# Patient Record
Sex: MALE | Race: WHITE | Employment: OTHER | URBAN - METROPOLITAN AREA
[De-identification: names, ages, dates, MRNs, and addresses within clinical notes are randomized per-mention and may not be internally consistent; named-entity substitution may affect disease eponyms.]

---

## 2017-02-13 ENCOUNTER — HOSPITAL ENCOUNTER (OUTPATIENT)
Dept: PHYSICAL THERAPY | Age: 82
Discharge: HOME | End: 2017-02-13
Payer: MEDICARE

## 2017-02-13 DIAGNOSIS — N32.81 OAB (OVERACTIVE BLADDER): ICD-10-CM

## 2017-02-13 PROCEDURE — 97110 THERAPEUTIC EXERCISES: CPT

## 2017-02-13 PROCEDURE — G8988 SELF CARE GOAL STATUS: HCPCS

## 2017-02-13 PROCEDURE — G8987 SELF CARE CURRENT STATUS: HCPCS

## 2017-02-13 PROCEDURE — 97162 PT EVAL MOD COMPLEX 30 MIN: CPT

## 2017-02-13 NOTE — PROGRESS NOTES
Bulverde Class  : 1935 [facility] at [address] [place] [address] 52, [address] [address],[address], [address]  [phone]   [phone]          OUTPATIENT PHYSICAL THERAPY:Initial Assessment 2017    ICD-10: Treatment Diagnosis: Urge incontinence (N39.41)  Precautions/Allergies:   Atorvastatin   Fall Risk Score: 1 (? 5 = High Risk)  MD Orders: Eval and treat MEDICAL/REFERRING DIAGNOSIS:  OAB (overactive bladder) [N32.81]   DATE OF ONSET: 10/26/2016  REFERRING PHYSICIAN: Jeremie Gruber MD  RETURN PHYSICIAN APPOINTMENT: TBD     INITIAL ASSESSMENT:  Mr. Fito Piña presents poor bladder health and poor strength and coordination of pelvic floor mm contributing to urge incontinence, noctoria,  and urinary frequency. He is unable localize PF musculature without cueing. She would benefit form skilled PT to address the above deficits. He would benefit from participating in a PF strengthening program with biofeedback for correct mm activation as well as NMES for improved mm activation. PROBLEM LIST (Impacting functional limitations): 1. poor bladder health  2. Decreased strength of pelvic floor which limits bladder control INTERVENTIONS PLANNED:  1. Neuromuscular re-education  2. Biofeedback as needed  3. HEP  4. Bladder retraining  5. Bladder education  6. Electrical Stimulation  7. Home Exercise Program (HEP)  8. Neuromuscular Re-education/Strengthening  9. Range of Motion (ROM)  10. Therapeutic Activites     TREATMENT PLAN:  Effective Dates: 2017 TO 2017. Frequency/Duration: 1 time a week for 12 weeks  GOALS: (Goals have been discussed and agreed upon with patient.)  Short-Term Functional Goals: Time Frame: in 3 weeks  1.  Patient will demonstrate independence with home exercise program.  2. Patient will demonstrate an independent contraction of the PFM without overflow muscle activity within 3 weeks with verbal cues only in order to progress pelvic floor strength pelvic floor strength and continence control. Discharge Goals: Time Frame: in 12 weeks  1. Pt will demonstrate an increase in PFM endurance from 8 hold to 15 hold x 10 reps as measured by EMG within 6 weeks in order to improve PFM activity and thus decrease incontinence. 2. Patient will incorporate a voiding schedule and urge suppression techniques into hisdaily routine to manage urgency, frequency, and incontinence with a goal of 10 minutes of delay. 3. Pt report voiding no less than every hours. 4. Pt will report nocturia less than 2 x per night. Rehabilitation Potential For Stated Goals: Good  Regarding Elliott Moreno therapy, I certify that the treatment plan above will be carried out by a therapist or under their direction. Thank you for this referral,  Lupe Chan PT     Referring Physician Signature: Siva Bobo MD              Date                    The information in this section was collected on 2/13/2017 (except where otherwise noted). HISTORY:   History of Present Injury/Illness (Reason for Referral): The patient is status post dilation of urethral stricture and bipolar photo vaporization of the prostate on October 26. Past Medical History/Comorbidities:   Mr. Yonatan Baez  has a past medical history of Atrial fibrillation ([address] CAD (coronary artery disease) (1987); Chronic obstructive pulmonary disease ([address] Elevated cholesterol; Hypertension; Left carotid stenosis; Myasthenia gravis ([address] Osteoarthritis (arthritis due to wear and tear of joints); Prostate cancer ([address] (2011); PVD (peripheral vascular disease) ([address] S/P total knee arthroplasty (11/14/2014); and Urinary urgency. Mr. Yonatan Baez  has a past surgical history that includes knee replacement (Right); vascular surgery procedure unlist (Right); cabg, artery-vein, two; and cataract removal (Bilateral).   Social History/Living Environment:    Lives at home with wife  Prior Level of Function/Work/Activity:  Golf, retired, yardwork  Personal Factors:          Past/Current Experience:  Did not need a pad previously. Current Medications:    Current Outpatient Prescriptions:     warfarin (COUMADIN) 7.5 mg tablet, Take 7.5 mg by mouth daily. Pt takes this 2 days a week, Disp: , Rfl:     warfarin (COUMADIN) 5 mg tablet, Take 5 mg by mouth daily. , Disp: , Rfl:     amoxicillin-clavulanate (AUGMENTIN) 500-125 mg per tablet, Take 1 Tab by mouth two (2) times a day., Disp: 14 Tab, Rfl: 0    MYRBETRIQ 50 mg ER tablet, Take 1 Tab by mouth daily. , Disp: 42 Tab, Rfl: 0    HYDROcodone-acetaminophen (NORCO) 7.5-325 mg per tablet, Take 1 Tab by mouth every four (4) hours as needed for Pain. Max Daily Amount: 6 Tabs., Disp: 20 Tab, Rfl: 0    cyanocobalamin 1,000 mcg tablet, Take 1,000 mcg by mouth nightly., Disp: , Rfl:     pyridostigmine (MESTINON) 60 mg tablet, Take 60 mg by mouth every morning., Disp: , Rfl:     metoprolol succinate (TOPROL-XL) 100 mg XL tablet, Take 100 mg by mouth nightly. Indications: Hypertension, Disp: , Rfl:     lisinopril (PRINIVIL, ZESTRIL) 20 mg tablet, Take 20 mg by mouth nightly. Indications: Hypertension, Disp: , Rfl:     simvastatin (ZOCOR) 40 mg tablet, Take 40 mg by mouth nightly.  Indications: HYPERCHOLESTEROLEMIA, Disp: , Rfl:    Date Last Reviewed:  2/13/2017   Incontinence History:  PROBLEM: YES/NO: COMMENTS:   Loss of urine with coughing NO    Loss of urine with lifting  NO    Loss of urine with exercise, running NO    Loss of urine with strong urge YES    Loss of urine with approaching the bathroom YES    Loss of urine with key in lock NO    Loss of urine as getting to toilet/removing clothing NO    Loss of urine when hearing running water YES    Have difficulty initiating a urine stream NO    Have difficulty stopping urine stream NO Unknown   Have to strain to empty bladder NO    Dribble urine when urinating NO    Dribble urine after emptying bladder NO    Experience pain with urination NO Experience burning during urination YES Getting better   Have blood in urine NO      Voiding Patterns: Patient voids every 30 min to 1 hours during the day and 4 times during the night (every 2 hours). Going about every 30 mins. Patient reports that he empties bladder. Patient uses pads for bladder protection; he changes pads 2 times per day. Fluid Intake: Patient drinks 2 cups of water per day. He consumes 1 cups of caffeinated beverages per day. Drinks 2 glasses a day of Coke. Patient not limit fluid intake to control bladder. Bowel Habits: Patient demonstrates normal bowel habits. Has 2-3 bowel movements a day. Mobility / Self Care: I  Personal / Social History:  · Sexually active? NO:   · Social activities restricted due to urinary incontinence? YES: Golf and daily activities. Number of Personal Factors/Comorbidities that affect the Plan of Care: 1-2: MODERATE COMPLEXITY   EXAMINATION:   External Observation:   · Voluntary Contraction: present  8 sec hold with poor coordination   · Voluntary Relaxation: present  · Involuntary Contraction: NT  · Involuntary Relaxation: Nt       Body Structures Involved:  1. Muscles Body Functions Affected:  1. Genitourinary Activities and Participation Affected:  1. Self Care  2. Community, Social and [place]   Number of elements that affect the Plan of Care: 3: MODERATE COMPLEXITY   CLINICAL PRESENTATION:   Presentation: Evolving clinical presentation with changing clinical characteristics: MODERATE COMPLEXITY   CLINICAL DECISION MAKING:   Outcome Measure:    Tool Used: NIH - Chronic Prostatitis Symptom Index (NIH - CPSI for Males)   Score:  Initial: 3/5/10 Most Recent: X (Date: -- )   Interpretation of Score:  Pain:  Score 0 1-4 5-8 9-12 13-16 17-20 21   Modifier CH CI CJ CK CL CM CN     Urinary Symptoms:  Score 0 1 2-3 4-5 6-7 8-9 10   Modifier CH CI CJ CK CL CM CN     Quality of Life Index:  Score 0 1-2 3-4 5-7 8-9 10-11 12   Modifier CH CI CJ CK CL CM CN ? Self Care:     - CURRENT STATUS: CM - 80%-99% impaired, limited or restricted    - GOAL STATUS: CI - 1%-19% impaired, limited or restricted    - D/C STATUS:  ---------------To be determined---------------       Medical Necessity:   · Patient is expected to demonstrate progress in strength and coordination to increase independence with Kegel. .  · Patient demonstrates good rehab potential due to higher previous functional level. Reason for Services/Other Comments:  · Patient continues to require skilled intervention due to poor strength and endurance as well as poor bladder health contributing to incontinence. .   Use of outcome tool(s) and clinical judgement create a POC that gives a: Questionable prediction of patient's progress: MODERATE COMPLEXITY   TREATMENT:   (In addition to Assessment/Re-Assessment sessions the following treatments were rendered)  Pre-treatment Symptoms/Complaints:  Urinary urgency and frequency. Pain: Initial:   Pain Intensity 1: 0 0/10 Post Session:  0/10     THERAPEUTIC EXERCISE: (20 minutes):  Exercises per grid below to improve strength and coordination. Required moderate verbal and tactile cues to promote proper body breathing techniques. Progressed resistance, repetitions and complexity of movement as indicated. Exercises:  Patient instructed in pelvic floor exercises listed below:   Date:  2/8/2017 Date:   Date:     Activity/Exercise Parameters Parameters Parameters   Pt education 10 min     Kegel in supine with manual assist for tactile feedback  10 sec on 10 sec off x 10   2 on: 2 off x 10 x 2                                       The following educational topics were reviewed with patient:  Bladder health, tips to control urge, bladder diary, pelvic floor anatomy, how foods affect bladder, bladder retraining. · Treatment/Session Assessment: . To promote tissue length and joint mobility for return to previous level of function.   · Response to Treatment: Good  · Compliance with Program/Exercises: Will assess as treatment progresses. · Recommendations/Intent for next treatment session: \"Next visit will focus on advancements to more challenging activities\".   Total Treatment Duration:  PT Patient Time In/Time Out  Time In: 1000  Time Out: 1100    Jeannie Coley PT

## 2017-02-13 NOTE — PROGRESS NOTES
Ambulatory/Rehab Services H2 Model Falls Risk Assessment    Risk Factor Pts. ·   Confusion/Disorientation/Impulsivity  []    4 ·   Symptomatic Depression  []   2 ·   Altered Elimination  []   1 ·   Dizziness/Vertigo  []   1 ·   Gender (Male)  [x]   1 ·   Any administered antiepileptics (anticonvulsants):  []   2 ·   Any administered benzodiazepines:  []   1 ·   Visual Impairment (specify):  []   1 ·   Portable Oxygen Use  []   1 ·   Orthostatic ? BP  []   1 ·   History of Recent Falls (within 3 mos.)  []   5     Ability to Rise from Chair (choose one) Pts. ·   Ability to rise in a single movement  [x]   0 ·   Pushes up, successful in one attempt  []   1 ·   Multiple attempts, but successful  []   3 ·   Unable to rise without assistance  []   4   Total: (5 or greater = High Risk) 1     Falls Prevention Plan:   []                Physical Limitations to Exercise (specify):   []                Mobility Assistance Device (type):   []                Exercise/Equipment Adaptation (specify):    ©2010 [facility] of [address] Patent #[id number].  Federal Law prohibits the replication, distribution or use without written permission from [facility] "[employer]

## 2017-02-20 ENCOUNTER — HOSPITAL ENCOUNTER (OUTPATIENT)
Dept: PHYSICAL THERAPY | Age: 82
Discharge: HOME | End: 2017-02-20
Payer: MEDICARE

## 2017-02-20 PROCEDURE — 97110 THERAPEUTIC EXERCISES: CPT

## 2017-02-20 NOTE — PROGRESS NOTES
Carrie Prader  : 1935 [facility] at [facility]  [address], [address] [address],[address], [address] [facility]  [phone]   [phone]          OUTPATIENT PHYSICAL THERAPY:Daily Note 2017    ICD-10: Treatment Diagnosis: Urge incontinence (N39.41)  Precautions/Allergies:   Atorvastatin   Fall Risk Score: 1 (? 5 = High Risk)  MD Orders: Eval and treat MEDICAL/REFERRING DIAGNOSIS:  Overactive bladder [N32.81]   DATE OF ONSET: 10/26/2016  REFERRING PHYSICIAN: Andrés Smith MD  RETURN PHYSICIAN APPOINTMENT: TBD     INITIAL ASSESSMENT:  Mr. Peace Lomax presents poor bladder health and poor strength and coordination of pelvic floor mm contributing to urge incontinence, noctoria,  and urinary frequency. He is unable localize PF musculature without cueing. She would benefit form skilled PT to address the above deficits. He would benefit from participating in a PF strengthening program with biofeedback for correct mm activation as well as NMES for improved mm activation. PROBLEM LIST (Impacting functional limitations): 1. poor bladder health  2. Decreased strength of pelvic floor which limits bladder control INTERVENTIONS PLANNED:  1. Neuromuscular re-education  2. Biofeedback as needed  3. HEP  4. Bladder retraining  5. Bladder education  6. Electrical Stimulation  7. Home Exercise Program (HEP)  8. Neuromuscular Re-education/Strengthening  9. Range of Motion (ROM)  10. Therapeutic Activites     TREATMENT PLAN:  Effective Dates: 2017 TO 2017. Frequency/Duration: 1 time a week for 12 weeks  GOALS: (Goals have been discussed and agreed upon with patient.)  Short-Term Functional Goals: Time Frame: in 3 weeks  1.  Patient will demonstrate independence with home exercise program.  2. Patient will demonstrate an independent contraction of the PFM without overflow muscle activity within 3 weeks with verbal cues only in order to progress pelvic floor strength pelvic floor strength and continence control. Discharge Goals: Time Frame: in 12 weeks  1. Pt will demonstrate an increase in PFM endurance from 8 hold to 15 hold x 10 reps as measured by EMG within 6 weeks in order to improve PFM activity and thus decrease incontinence. 2. Patient will incorporate a voiding schedule and urge suppression techniques into hisdaily routine to manage urgency, frequency, and incontinence with a goal of 10 minutes of delay. 3. Pt report voiding no less than every hours. 4. Pt will report nocturia less than 2 x per night. Rehabilitation Potential For Stated Goals: Good  Regarding Felipa Doll therapy, I certify that the treatment plan above will be carried out by a therapist or under their direction. Thank you for this referral,  [facility], PT     Referring Physician Signature: Jessica Weinberg MD              Date                    The information in this section was collected on 2/13/2017 (except where otherwise noted). HISTORY:   History of Present Injury/Illness (Reason for Referral): The patient is status post dilation of urethral stricture and bipolar photo vaporization of the prostate on October 26. Past Medical History/Comorbidities:   Mr. Patric Zhao  has a past medical history of Atrial fibrillation ([address] CAD (coronary artery disease) (1987); Chronic obstructive pulmonary disease ([address] Elevated cholesterol; Hypertension; Left carotid stenosis; Myasthenia gravis ([address] Osteoarthritis (arthritis due to wear and tear of joints); Prostate cancer ([address] (2011); PVD (peripheral vascular disease) ([address] S/P total knee arthroplasty (11/14/2014); and Urinary urgency. Mr. Patric Zhao  has a past surgical history that includes knee replacement (Right); vascular surgery procedure unlist (Right); cabg, artery-vein, two; and cataract removal (Bilateral).   Social History/Living Environment:    Lives at home with wife  Prior Level of Function/Work/Activity:  Golf, retired, yardwork  Personal Factors:          Past/Current Experience:  Did not need a pad previously. Current Medications:    Current Outpatient Prescriptions:     warfarin (COUMADIN) 7.5 mg tablet, Take 7.5 mg by mouth daily. Pt takes this 2 days a week, Disp: , Rfl:     warfarin (COUMADIN) 5 mg tablet, Take 5 mg by mouth daily. , Disp: , Rfl:     amoxicillin-clavulanate (AUGMENTIN) 500-125 mg per tablet, Take 1 Tab by mouth two (2) times a day., Disp: 14 Tab, Rfl: 0    MYRBETRIQ 50 mg ER tablet, Take 1 Tab by mouth daily. , Disp: 42 Tab, Rfl: 0    HYDROcodone-acetaminophen (NORCO) 7.5-325 mg per tablet, Take 1 Tab by mouth every four (4) hours as needed for Pain. Max Daily Amount: 6 Tabs., Disp: 20 Tab, Rfl: 0    cyanocobalamin 1,000 mcg tablet, Take 1,000 mcg by mouth nightly., Disp: , Rfl:     pyridostigmine (MESTINON) 60 mg tablet, Take 60 mg by mouth every morning., Disp: , Rfl:     metoprolol succinate (TOPROL-XL) 100 mg XL tablet, Take 100 mg by mouth nightly. Indications: Hypertension, Disp: , Rfl:     lisinopril (PRINIVIL, ZESTRIL) 20 mg tablet, Take 20 mg by mouth nightly. Indications: Hypertension, Disp: , Rfl:     simvastatin (ZOCOR) 40 mg tablet, Take 40 mg by mouth nightly.  Indications: HYPERCHOLESTEROLEMIA, Disp: , Rfl:    Date Last Reviewed:  2/20/2017   Incontinence History:  PROBLEM: YES/NO: COMMENTS:   Loss of urine with coughing NO    Loss of urine with lifting  NO    Loss of urine with exercise, running NO    Loss of urine with strong urge YES    Loss of urine with approaching the bathroom YES    Loss of urine with key in lock NO    Loss of urine as getting to toilet/removing clothing NO    Loss of urine when hearing running water YES    Have difficulty initiating a urine stream NO    Have difficulty stopping urine stream NO Unknown   Have to strain to empty bladder NO    Dribble urine when urinating NO    Dribble urine after emptying bladder NO    Experience pain with urination NO Experience burning during urination YES Getting better   Have blood in urine NO      Voiding Patterns: Patient voids every 30 min to 1 hours during the day and 4 times during the night (every 2 hours). Going about every 30 mins. Patient reports that he empties bladder. Patient uses pads for bladder protection; he changes pads 2 times per day. Fluid Intake: Patient drinks 2 cups of water per day. He consumes 1 cups of caffeinated beverages per day. Drinks 2 glasses a day of Coke. Patient not limit fluid intake to control bladder. Bowel Habits: Patient demonstrates normal bowel habits. Has 2-3 bowel movements a day. Mobility / Self Care: I  Personal / Social History:  · Sexually active? NO:   · Social activities restricted due to urinary incontinence? YES: Golf and daily activities. Number of Personal Factors/Comorbidities that affect the Plan of Care: 1-2: MODERATE COMPLEXITY   EXAMINATION:   External Observation:   · Voluntary Contraction: present  8 sec hold with poor coordination   · Voluntary Relaxation: present  · Involuntary Contraction: NT  · Involuntary Relaxation: Nt       Body Structures Involved:  1. Muscles Body Functions Affected:  1. Genitourinary Activities and Participation Affected:  1. Self Care  2. Community, Social and [place]   Number of elements that affect the Plan of Care: 3: MODERATE COMPLEXITY   CLINICAL PRESENTATION:   Presentation: Evolving clinical presentation with changing clinical characteristics: MODERATE COMPLEXITY   CLINICAL DECISION MAKING:   Outcome Measure:    Tool Used: NIH - Chronic Prostatitis Symptom Index (NIH - CPSI for Males)   Score:  Initial: 3/5/10 Most Recent: X (Date: -- )   Interpretation of Score:  Pain:  Score 0 1-4 5-8 9-12 13-16 17-20 21   Modifier CH CI CJ CK CL CM CN     Urinary Symptoms:  Score 0 1 2-3 4-5 6-7 8-9 10   Modifier CH CI CJ CK CL CM CN     Quality of Life Index:  Score 0 1-2 3-4 5-7 8-9 10-11 12   Modifier CH CI CJ CK CL CM CN ? Self Care:     - CURRENT STATUS: CM - 80%-99% impaired, limited or restricted    - GOAL STATUS: CI - 1%-19% impaired, limited or restricted    - D/C STATUS:  ---------------To be determined---------------       Medical Necessity:   · Patient is expected to demonstrate progress in strength and coordination to increase independence with Kegel. .  · Patient demonstrates good rehab potential due to higher previous functional level. Reason for Services/Other Comments:  · Patient continues to require skilled intervention due to poor strength and endurance as well as poor bladder health contributing to incontinence. .   Use of outcome tool(s) and clinical judgement create a POC that gives a: Questionable prediction of patient's progress: MODERATE COMPLEXITY   TREATMENT:   (In addition to Assessment/Re-Assessment sessions the following treatments were rendered)  Pre-treatment Symptoms/Complaints:  Still going to the restroom about every 30 min and using 2 PPD. Got up 2 x last night. Drinking about 3 cups of water a day. Pain: Initial:   Pain Intensity 1: 0 0/10 Post Session:  0/10     THERAPEUTIC EXERCISE: (55 minutes):  Exercises per grid below to improve strength and coordination. Required moderate verbal and tactile cues to promote proper body breathing techniques. Progressed resistance, repetitions and complexity of movement as indicated. Exercises:  Patient instructed in pelvic floor exercises listed below:   Date:  2/8/2017 Date:  2/20/2017 Date:     Activity/Exercise Parameters Parameters Parameters   Pt education 10 min 20 min    Kegel in supine with manual assist for tactile feedback  10 sec on 10 sec off x 10   2 on: 2 off x 10 x 2     Kegel in supine, sitting, and with sit to stand with biofeedback assist for visual feedback     35 min total  Various holds and reps. Quick and endurance. Pt requires increased cueing to decrease us of abdominal mm to improve Kegel.   Not able to hold greater than 3 sec with biofeedback. The following educational topics were reviewed with patient:  Urgency suppression, time voiding, bladder diary  · Treatment/Session Assessment: . To promote tissue length and joint mobility for return to previous level of function. · Response to Treatment:  Good  · Compliance with Program/Exercises: Will assess as treatment progresses. · Recommendations/Intent for next treatment session: \"Next visit will focus on advancements to more challenging activities\".   Total Treatment Duration:  PT Patient Time In/Time Out  Time In: 1230  Time Out: [address] [facility],

## 2017-02-27 ENCOUNTER — HOSPITAL ENCOUNTER (OUTPATIENT)
Dept: PHYSICAL THERAPY | Age: 82
Discharge: HOME | End: 2017-02-27
Payer: MEDICARE

## 2017-02-27 PROCEDURE — 97140 MANUAL THERAPY 1/> REGIONS: CPT

## 2017-02-27 NOTE — PROGRESS NOTES
Mikayla Loco  : [facility] [place] [facility] [address], [address]  [phone]   [phone]          OUTPATIENT PHYSICAL THERAPY:Daily Note 2017    ICD-10: Treatment Diagnosis: Urge incontinence (N39.41)  Precautions/Allergies:   Atorvastatin   Fall Risk Score: 1 (? 5 = High Risk)  MD Orders: Eval and treat MEDICAL/REFERRING DIAGNOSIS:  Overactive bladder [N32.81]   DATE OF ONSET: 10/26/2016  REFERRING PHYSICIAN: Britney Suárez MD  RETURN PHYSICIAN APPOINTMENT: TBD     INITIAL ASSESSMENT:  Mr. Raymond Flores presents poor bladder health and poor strength and coordination of pelvic floor mm contributing to urge incontinence, noctoria,  and urinary frequency. He is unable localize PF musculature without cueing. She would benefit form skilled PT to address the above deficits. He would benefit from participating in a PF strengthening program with biofeedback for correct mm activation as well as NMES for improved mm activation. PROBLEM LIST (Impacting functional limitations): 1. poor bladder health  2. Decreased strength of pelvic floor which limits bladder control INTERVENTIONS PLANNED:  1. Neuromuscular re-education  2. Biofeedback as needed  3. HEP  4. Bladder retraining  5. Bladder education  6. Electrical Stimulation  7. Home Exercise Program (HEP)  8. Neuromuscular Re-education/Strengthening  9. Range of Motion (ROM)  10. Therapeutic Activites     TREATMENT PLAN:  Effective Dates: 2017 TO 2017. Frequency/Duration: 1 time a week for 12 weeks  GOALS: (Goals have been discussed and agreed upon with patient.)  Short-Term Functional Goals: Time Frame: in 3 weeks  1.  Patient will demonstrate independence with home exercise program.  2. Patient will demonstrate an independent contraction of the PFM without overflow muscle activity within 3 weeks with verbal cues only in order to progress pelvic floor strength pelvic floor strength and continence control. Discharge Goals: Time Frame: in 12 weeks  1. Pt will demonstrate an increase in PFM endurance from 8 hold to 15 hold x 10 reps as measured by EMG within 6 weeks in order to improve PFM activity and thus decrease incontinence. 2. Patient will incorporate a voiding schedule and urge suppression techniques into hisdaily routine to manage urgency, frequency, and incontinence with a goal of 10 minutes of delay. 3. Pt report voiding no less than every hours. 4. Pt will report nocturia less than 2 x per night. Rehabilitation Potential For Stated Goals: Good  Regarding Sherie Leblanc therapy, I certify that the treatment plan above will be carried out by a therapist or under their direction. Thank you for this referral,  Jose Cortez PT     Referring Physician Signature: Shen Stephenson MD              Date                    The information in this section was collected on 2/13/2017 (except where otherwise noted). HISTORY:   History of Present Injury/Illness (Reason for Referral): The patient is status post dilation of urethral stricture and bipolar photo vaporization of the prostate on October 26. Past Medical History/Comorbidities:   Mr. Rosalia Albarran  has a past medical history of Atrial fibrillation ([address] CAD (coronary artery disease) (1987); Chronic obstructive pulmonary disease ([address] Elevated cholesterol; Hypertension; Left carotid stenosis; Myasthenia gravis ([address] Osteoarthritis (arthritis due to wear and tear of joints); Prostate cancer ([address] (2011); PVD (peripheral vascular disease) ([address] S/P total knee arthroplasty (11/14/2014); and Urinary urgency. Mr. Rosalia Albarran  has a past surgical history that includes knee replacement (Right); vascular surgery procedure unlist (Right); cabg, artery-vein, two; and cataract removal (Bilateral).   Social History/Living Environment:    Lives at home with wife  Prior Level of Function/Work/Activity:  Golf, retired, yardwork  Personal Factors:          Past/Current Experience:  Did not need a pad previously. Current Medications:    Current Outpatient Prescriptions:     warfarin (COUMADIN) 7.5 mg tablet, Take 7.5 mg by mouth daily. Pt takes this 2 days a week, Disp: , Rfl:     warfarin (COUMADIN) 5 mg tablet, Take 5 mg by mouth daily. , Disp: , Rfl:     amoxicillin-clavulanate (AUGMENTIN) 500-125 mg per tablet, Take 1 Tab by mouth two (2) times a day., Disp: 14 Tab, Rfl: 0    MYRBETRIQ 50 mg ER tablet, Take 1 Tab by mouth daily. , Disp: 42 Tab, Rfl: 0    HYDROcodone-acetaminophen (NORCO) 7.5-325 mg per tablet, Take 1 Tab by mouth every four (4) hours as needed for Pain. Max Daily Amount: 6 Tabs., Disp: 20 Tab, Rfl: 0    cyanocobalamin 1,000 mcg tablet, Take 1,000 mcg by mouth nightly., Disp: , Rfl:     pyridostigmine (MESTINON) 60 mg tablet, Take 60 mg by mouth every morning., Disp: , Rfl:     metoprolol succinate (TOPROL-XL) 100 mg XL tablet, Take 100 mg by mouth nightly. Indications: Hypertension, Disp: , Rfl:     lisinopril (PRINIVIL, ZESTRIL) 20 mg tablet, Take 20 mg by mouth nightly. Indications: Hypertension, Disp: , Rfl:     simvastatin (ZOCOR) 40 mg tablet, Take 40 mg by mouth nightly.  Indications: HYPERCHOLESTEROLEMIA, Disp: , Rfl:    Date Last Reviewed:  2/27/2017   Incontinence History:  PROBLEM: YES/NO: COMMENTS:   Loss of urine with coughing NO    Loss of urine with lifting  NO    Loss of urine with exercise, running NO    Loss of urine with strong urge YES    Loss of urine with approaching the bathroom YES    Loss of urine with key in lock NO    Loss of urine as getting to toilet/removing clothing NO    Loss of urine when hearing running water YES    Have difficulty initiating a urine stream NO    Have difficulty stopping urine stream NO Unknown   Have to strain to empty bladder NO    Dribble urine when urinating NO    Dribble urine after emptying bladder NO    Experience pain with urination NO Experience burning during urination YES Getting better   Have blood in urine NO      Voiding Patterns: Patient voids every 30 min to 1 hours during the day and 4 times during the night (every 2 hours). Going about every 30 mins. Patient reports that he empties bladder. Patient uses pads for bladder protection; he changes pads 2 times per day. Fluid Intake: Patient drinks 2 cups of water per day. He consumes 1 cups of caffeinated beverages per day. Drinks 2 glasses a day of Coke. Patient not limit fluid intake to control bladder. Bowel Habits: Patient demonstrates normal bowel habits. Has 2-3 bowel movements a day. Mobility / Self Care: I  Personal / Social History:  · Sexually active? NO:   · Social activities restricted due to urinary incontinence? YES: Golf and daily activities. Number of Personal Factors/Comorbidities that affect the Plan of Care: 1-2: MODERATE COMPLEXITY   EXAMINATION:   External Observation:   · Voluntary Contraction: present  8 sec hold with poor coordination   · Voluntary Relaxation: present  · Involuntary Contraction: NT  · Involuntary Relaxation: Nt       Body Structures Involved:  1. Muscles Body Functions Affected:  1. Genitourinary Activities and Participation Affected:  1. Self Care  2. Community, Social and [place]   Number of elements that affect the Plan of Care: 3: MODERATE COMPLEXITY   CLINICAL PRESENTATION:   Presentation: Evolving clinical presentation with changing clinical characteristics: MODERATE COMPLEXITY   CLINICAL DECISION MAKING:   Outcome Measure:    Tool Used: NIH - Chronic Prostatitis Symptom Index (NIH - CPSI for Males)   Score:  Initial: 3/5/10 Most Recent: X (Date: -- )   Interpretation of Score:  Pain:  Score 0 1-4 5-8 9-12 13-16 17-20 21   Modifier CH CI CJ CK CL CM CN     Urinary Symptoms:  Score 0 1 2-3 4-5 6-7 8-9 10   Modifier CH CI CJ CK CL CM CN     Quality of Life Index:  Score 0 1-2 3-4 5-7 8-9 10-11 12   Modifier CH CI CJ CK CL CM CN ? Self Care:     - CURRENT STATUS: CM - 80%-99% impaired, limited or restricted    - GOAL STATUS: CI - 1%-19% impaired, limited or restricted    - D/C STATUS:  ---------------To be determined---------------       Medical Necessity:   · Patient is expected to demonstrate progress in strength and coordination to increase independence with Kegel. .  · Patient demonstrates good rehab potential due to higher previous functional level. Reason for Services/Other Comments:  · Patient continues to require skilled intervention due to poor strength and endurance as well as poor bladder health contributing to incontinence. .   Use of outcome tool(s) and clinical judgement create a POC that gives a: Questionable prediction of patient's progress: MODERATE COMPLEXITY   TREATMENT:   (In addition to Assessment/Re-Assessment sessions the following treatments were rendered)  Pre-treatment Symptoms/Complaints:  Reviewed bladder diary today. Leaked 3/7 days this week. Does not see much change. Pain: Initial:   Pain Intensity 1: 0 0/10 Post Session:  0/10     THERAPEUTIC EXERCISE: (58 minutes):  Exercises per grid below to improve strength and coordination. Required moderate verbal and tactile cues to promote proper body breathing techniques. Progressed resistance, repetitions and complexity of movement as indicated. Exercises:  Patient instructed in pelvic floor exercises listed below:   Date:  2/8/2017 Date:  2/20/2017 Date:  2/27/2017   Activity/Exercise Parameters Parameters Parameters   Pt education 10 min 20 min 28 min   Kegel in supine with manual assist for tactile feedback  10 sec on 10 sec off x 10   2 on: 2 off x 10 x 2     Kegel in supine, sitting, and with sit to stand with biofeedback assist for visual feedback     35 min total  Various holds and reps. Quick and endurance. Pt requires increased cueing to decrease us of abdominal mm to improve Kegel.   Not able to hold greater than 3 sec with biofeedback. 20 min total  Various holds and reps. Quick and endurance. Pt requires increased cueing to decrease us of abdominal mm to improve Kegel. Not able to hold greater than 3 sec with biofeedback. Sitting on swiss ball with PF contractions    5 sec on 10 off x 10                         The following educational topics were reviewed with patient:  Bladder diary review. Pt is voiding approx 9x per day. He had 3 days out of 7 in which he leaked during the day. He continues to get up 2 X per night and is leaking while sleeping. Will add a coke back in to his diet to see if that increases voids. · Treatment/Session Assessment: Pt continues to have a high resting tone according to biofeedback of 7-8 uV. Continues to require increased cueing for proper pelvic floor contraction. · Response to Treatment:  Good  · Compliance with Program/Exercises: Will assess as treatment progresses. · Recommendations/Intent for next treatment session: \"Next visit will focus on advancements to more challenging activities\".   Total Treatment Duration:  PT Patient Time In/Time Out  Time In: 1100  Time Out: 901 [address],

## 2017-03-06 ENCOUNTER — HOSPITAL ENCOUNTER (OUTPATIENT)
Dept: PHYSICAL THERAPY | Age: 82
Discharge: HOME | End: 2017-03-06
Payer: MEDICARE

## 2017-03-06 PROCEDURE — 97110 THERAPEUTIC EXERCISES: CPT

## 2017-03-06 NOTE — PROGRESS NOTES
Niya Perez  : [facility] [address], [address] [place] [address],[address], [facility] [address]  [phone]   [phone]          OUTPATIENT PHYSICAL THERAPY:Daily Note and Progress Report 3/6/2017    ICD-10: Treatment Diagnosis: Urge incontinence (N39.41)  Precautions/Allergies:   Atorvastatin   Fall Risk Score: 1 (? 5 = High Risk)  MD Orders: Eval and treat MEDICAL/REFERRING DIAGNOSIS:  Overactive bladder [N32.81]   DATE OF ONSET: 10/26/2016  REFERRING PHYSICIAN: Jamaica Escalante MD  RETURN PHYSICIAN APPOINTMENT: TBD     ASSESSMENT:  Mr. Dm Pierson has been seen for 3 physical therapy sessions. He has gone from getting up 4 x per night to 1-2 nights per night. He has reduced his frequency from 30 min to 1-2 hours. He is still using 2-3 PPD. He is now about to coordinate his PF contraction about 50% of the time. He continues to be a good candidate for physical therapy to address urinary incontinence. PROBLEM LIST (Impacting functional limitations): 1. poor bladder health  2. Decreased strength of pelvic floor which limits bladder control INTERVENTIONS PLANNED:  1. Neuromuscular re-education  2. Biofeedback as needed  3. HEP  4. Bladder retraining  5. Bladder education  6. Electrical Stimulation  7. Home Exercise Program (HEP)  8. Neuromuscular Re-education/Strengthening  9. Range of Motion (ROM)  10. Therapeutic Activites     TREATMENT PLAN:  Effective Dates: 2017 TO 2017. Frequency/Duration: 1 time a week for 12 weeks  GOALS: (Goals have been discussed and agreed upon with patient.)  Short-Term Functional Goals: Time Frame: in 3 weeks  1. Patient will demonstrate independence with home exercise program.  2. Patient will demonstrate an independent contraction of the PFM without overflow muscle activity within 3 weeks with verbal cues only in order to progress pelvic floor strength pelvic floor strength and continence control.    Discharge Goals: Time Frame: in 12 weeks  1. Pt will demonstrate an increase in PFM endurance from 8 hold to 15 hold x 10 reps as measured by EMG within 6 weeks in order to improve PFM activity and thus decrease incontinence. 2. Patient will incorporate a voiding schedule and urge suppression techniques into hisdaily routine to manage urgency, frequency, and incontinence with a goal of 10 minutes of delay. 3. Pt report voiding no less than every hours. 4. Pt will report nocturia less than 2 x per night. Rehabilitation Potential For Stated Goals: Good  Regarding J[facility]  therapy, I certify that the treatment plan above will be carried out by a therapist or under their direction. Thank you for this referral,  Vicky Chase PT     Referring Physician Signature: Britney Suárez MD              Date                    The information in this section was collected on 2/13/2017 (except where otherwise noted). HISTORY:   History of Present Injury/Illness (Reason for Referral): The patient is status post dilation of urethral stricture and bipolar photo vaporization of the prostate on October 26. Past Medical History/Comorbidities:   Mr. Raymond Flores  has a past medical history of Atrial fibrillation ([address] CAD (coronary artery disease) (1987); Chronic obstructive pulmonary disease ([address] Elevated cholesterol; Hypertension; Left carotid stenosis; Myasthenia gravis ([address] Osteoarthritis (arthritis due to wear and tear of joints); Prostate cancer ([address] (2011); PVD (peripheral vascular disease) ([address] S/P total knee arthroplasty (11/14/2014); and Urinary urgency. Mr. Raymond Flores  has a past surgical history that includes knee replacement (Right); vascular surgery procedure unlist (Right); cabg, artery-vein, two; and cataract removal (Bilateral).   Social History/Living Environment:    Lives at home with wife  Prior Level of Function/Work/Activity:  Golf, retired, yardwork  Personal Factors:          Past/Current Experience:  Did not need a pad previously. Current Medications:    Current Outpatient Prescriptions:     warfarin (COUMADIN) 7.5 mg tablet, Take 7.5 mg by mouth daily. Pt takes this 2 days a week, Disp: , Rfl:     warfarin (COUMADIN) 5 mg tablet, Take 5 mg by mouth daily. , Disp: , Rfl:     amoxicillin-clavulanate (AUGMENTIN) 500-125 mg per tablet, Take 1 Tab by mouth two (2) times a day., Disp: 14 Tab, Rfl: 0    MYRBETRIQ 50 mg ER tablet, Take 1 Tab by mouth daily. , Disp: 42 Tab, Rfl: 0    HYDROcodone-acetaminophen (NORCO) 7.5-325 mg per tablet, Take 1 Tab by mouth every four (4) hours as needed for Pain. Max Daily Amount: 6 Tabs., Disp: 20 Tab, Rfl: 0    cyanocobalamin 1,000 mcg tablet, Take 1,000 mcg by mouth nightly., Disp: , Rfl:     pyridostigmine (MESTINON) 60 mg tablet, Take 60 mg by mouth every morning., Disp: , Rfl:     metoprolol succinate (TOPROL-XL) 100 mg XL tablet, Take 100 mg by mouth nightly. Indications: Hypertension, Disp: , Rfl:     lisinopril (PRINIVIL, ZESTRIL) 20 mg tablet, Take 20 mg by mouth nightly. Indications: Hypertension, Disp: , Rfl:     simvastatin (ZOCOR) 40 mg tablet, Take 40 mg by mouth nightly.  Indications: HYPERCHOLESTEROLEMIA, Disp: , Rfl:    Date Last Reviewed:  3/6/2017   Incontinence History:  PROBLEM: YES/NO: COMMENTS:   Loss of urine with coughing NO    Loss of urine with lifting  NO    Loss of urine with exercise, running NO    Loss of urine with strong urge YES    Loss of urine with approaching the bathroom YES    Loss of urine with key in lock NO    Loss of urine as getting to toilet/removing clothing NO    Loss of urine when hearing running water YES    Have difficulty initiating a urine stream NO    Have difficulty stopping urine stream NO Unknown   Have to strain to empty bladder NO    Dribble urine when urinating NO    Dribble urine after emptying bladder NO    Experience pain with urination NO    Experience burning during urination YES Getting better   Have blood in urine NO Voiding Patterns: Patient voids every 30 min to 1 hours during the day and 4 times during the night (every 2 hours). Going about every 30 mins. Patient reports that he empties bladder. Patient uses pads for bladder protection; he changes pads 2 times per day. Fluid Intake: Patient drinks 2 cups of water per day. He consumes 1 cups of caffeinated beverages per day. Drinks 2 glasses a day of Coke. Patient not limit fluid intake to control bladder. Bowel Habits: Patient demonstrates normal bowel habits. Has 2-3 bowel movements a day. Mobility / Self Care: I  Personal / Social History:  · Sexually active? NO:   · Social activities restricted due to urinary incontinence? YES: Golf and daily activities. Number of Personal Factors/Comorbidities that affect the Plan of Care: 1-2: MODERATE COMPLEXITY   EXAMINATION:   External Observation:   · Voluntary Contraction: present  8 sec hold with poor coordination   · Voluntary Relaxation: present  · Involuntary Contraction: NT  · Involuntary Relaxation: Nt       Body Structures Involved:  1. Muscles Body Functions Affected:  1. Genitourinary Activities and Participation Affected:  1. Self Care  2. Community, Social and [place]   Number of elements that affect the Plan of Care: 3: MODERATE COMPLEXITY   CLINICAL PRESENTATION:   Presentation: Evolving clinical presentation with changing clinical characteristics: MODERATE COMPLEXITY   CLINICAL DECISION MAKING:   Outcome Measure: Tool Used: NIH - Chronic Prostatitis Symptom Index (NIH - CPSI for Males)   Score:  Initial: 3/5/10 Most Recent: X (Date: -- )   Interpretation of Score:  Pain:  Score 0 1-4 5-8 9-12 13-16 17-20 21   Modifier CH CI CJ CK CL CM CN     Urinary Symptoms:  Score 0 1 2-3 4-5 6-7 8-9 10   Modifier CH CI CJ CK CL CM CN     Quality of Life Index:  Score 0 1-2 3-4 5-7 8-9 10-11 12   Modifier CH CI CJ CK CL CM CN       ?  Self Care:     - CURRENT STATUS: CM - 80%-99% impaired, limited or restricted    - GOAL STATUS: CI - 1%-19% impaired, limited or restricted    - D/C STATUS:  ---------------To be determined---------------       Medical Necessity:   · Patient is expected to demonstrate progress in strength and coordination to increase independence with Kegel. .  · Patient demonstrates good rehab potential due to higher previous functional level. Reason for Services/Other Comments:  · Patient continues to require skilled intervention due to poor strength and endurance as well as poor bladder health contributing to incontinence. .   Use of outcome tool(s) and clinical judgement create a POC that gives a: Questionable prediction of patient's progress: MODERATE COMPLEXITY   TREATMENT:   (In addition to Assessment/Re-Assessment sessions the following treatments were rendered)  Pre-treatment Symptoms/Complaints: Tried to return to drinking coke. Still using 2 PPD and they are saturated. Got up only 2 x last night but got up only 1x the night before. Pain: Initial:   Pain Intensity 1: 0 0/10 Post Session:  0/10     THERAPEUTIC EXERCISE: (45 minutes):  Exercises per grid below to improve strength and coordination. Required moderate verbal and tactile cues to promote proper body breathing techniques. Progressed resistance, repetitions and complexity of movement as indicated. Exercises:  Patient instructed in pelvic floor exercises listed below:   Date:  2/8/2017 Date:  2/20/2017 Date:  2/27/2017 Date:  3/6/2017   Activity/Exercise Parameters Parameters Parameters    Pt education 10 min 20 min 28 min    Kegel in supine with manual assist for tactile feedback  10 sec on 10 sec off x 10   2 on: 2 off x 10 x 2      Kegel in supine, sitting, and with sit to stand with biofeedback assist for visual feedback     35 min total  Various holds and reps. Quick and endurance. Pt requires increased cueing to decrease us of abdominal mm to improve Kegel.   Not able to hold greater than 3 sec with biofeedback. 20 min total  Various holds and reps. Quick and endurance. Pt requires increased cueing to decrease us of abdominal mm to improve Kegel. Not able to hold greater than 3 sec with biofeedback. 20 min total  Various holds and reps. Quick and endurance. Pt requires increased cueing to decrease us of abdominal mm to improve Kegel. Not able to hold greater than 3 sec with biofeedback. Sitting on swiss ball with PF contractions    5 sec on 10 off x 10 5 sec on 10 off x 10    Standing with Kegel and marching    X 10    Kegel with NMES 50 Hz    5 min 5 on 10 off              · The following educational topics were reviewed with patient:   · Treatment/Session Assessment:  Pt reported no leaking with therapy today. Improving with his Kegels. · Response to Treatment:  Good  · Compliance with Program/Exercises: Will assess as treatment progresses. · Recommendations/Intent for next treatment session: \"Next visit will focus on advancements to more challenging activities\".   Total Treatment Duration:  PT Patient Time In/Time Out  Time In: 1100  Time Out: 1200    Agnieszka Banda, PT

## 2017-03-14 ENCOUNTER — HOSPITAL ENCOUNTER (OUTPATIENT)
Dept: PHYSICAL THERAPY | Age: 82
Discharge: HOME | End: 2017-03-14
Payer: MEDICARE

## 2017-03-14 PROCEDURE — 97110 THERAPEUTIC EXERCISES: CPT

## 2017-03-14 NOTE — PROGRESS NOTES
Maico Duron  : 1935 [facility] at [address], [address] [place] [address],[address], [facility] [address]  [phone]   [phone]          OUTPATIENT PHYSICAL THERAPY:Daily Note 3/14/2017    ICD-10: Treatment Diagnosis: Urge incontinence (N39.41)  Precautions/Allergies:   Atorvastatin   Fall Risk Score: 1 (? 5 = High Risk)  MD Orders: Eval and treat MEDICAL/REFERRING DIAGNOSIS:  Overactive bladder [N32.81]   DATE OF ONSET: 10/26/2016  REFERRING PHYSICIAN: Denise Linda MD  RETURN PHYSICIAN APPOINTMENT: TBD     ASSESSMENT:  Mr. Briana Beverly has been seen for 3 physical therapy sessions. He has gone from getting up 4 x per night to 1-2 nights per night. He has reduced his frequency from 30 min to 1-2 hours. He is still using 2-3 PPD. He is now about to coordinate his PF contraction about 50% of the time. He continues to be a good candidate for physical therapy to address urinary incontinence. PROBLEM LIST (Impacting functional limitations): 1. poor bladder health  2. Decreased strength of pelvic floor which limits bladder control INTERVENTIONS PLANNED:  1. Neuromuscular re-education  2. Biofeedback as needed  3. HEP  4. Bladder retraining  5. Bladder education  6. Electrical Stimulation  7. Home Exercise Program (HEP)  8. Neuromuscular Re-education/Strengthening  9. Range of Motion (ROM)  10. Therapeutic Activites     TREATMENT PLAN:  Effective Dates: 2017 TO 2017. Frequency/Duration: 1 time a week for 12 weeks  GOALS: (Goals have been discussed and agreed upon with patient.)  Short-Term Functional Goals: Time Frame: in 3 weeks  1. Patient will demonstrate independence with home exercise program.  2. Patient will demonstrate an independent contraction of the PFM without overflow muscle activity within 3 weeks with verbal cues only in order to progress pelvic floor strength pelvic floor strength and continence control. Discharge Goals: Time Frame: in 12 weeks  1.  Pt will demonstrate an increase in PFM endurance from 8 hold to 15 hold x 10 reps as measured by EMG within 6 weeks in order to improve PFM activity and thus decrease incontinence. 2. Patient will incorporate a voiding schedule and urge suppression techniques into hisdaily routine to manage urgency, frequency, and incontinence with a goal of 10 minutes of delay. 3. Pt report voiding no less than every hours. 4. Pt will report nocturia less than 2 x per night. Rehabilitation Potential For Stated Goals: Good  Regarding Seymour Boast therapy, I certify that the treatment plan above will be carried out by a therapist or under their direction. Thank you for this referral,  Duglas Kat, PT     Referring Physician Signature: Adamaris Lee MD              Date                    The information in this section was collected on 2/13/2017 (except where otherwise noted). HISTORY:   History of Present Injury/Illness (Reason for Referral): The patient is status post dilation of urethral stricture and bipolar photo vaporization of the prostate on October 26. Past Medical History/Comorbidities:   Mr. Angel Ward  has a past medical history of Atrial fibrillation ([address] CAD (coronary artery disease) (1987); Chronic obstructive pulmonary disease ([address] Elevated cholesterol; Hypertension; Left carotid stenosis; Myasthenia gravis ([address] Osteoarthritis (arthritis due to wear and tear of joints); Prostate cancer ([address] (2011); PVD (peripheral vascular disease) ([address] S/P total knee arthroplasty (11/14/2014); and Urinary urgency. Mr. Angel Ward  has a past surgical history that includes knee replacement (Right); vascular surgery procedure unlist (Right); cabg, artery-vein, two; and cataract removal (Bilateral). Social History/Living Environment:    Lives at home with wife  Prior Level of Function/Work/Activity:  Golf, retired, yardwork  Personal Factors:          Past/Current Experience:  Did not need a pad previously. Current Medications:    Current Outpatient Prescriptions:     oxybutynin chloride XL (DITROPAN XL) 10 mg CR tablet, Take 1 Tab by mouth daily. , Disp: 30 Tab, Rfl: 11    warfarin (COUMADIN) 7.5 mg tablet, Take 7.5 mg by mouth daily. Pt takes this 2 days a week, Disp: , Rfl:     warfarin (COUMADIN) 5 mg tablet, Take 5 mg by mouth daily. , Disp: , Rfl:     MYRBETRIQ 50 mg ER tablet, Take 1 Tab by mouth daily. , Disp: 42 Tab, Rfl: 0    cyanocobalamin 1,000 mcg tablet, Take 1,000 mcg by mouth nightly., Disp: , Rfl:     pyridostigmine (MESTINON) 60 mg tablet, Take 60 mg by mouth every morning., Disp: , Rfl:     metoprolol succinate (TOPROL-XL) 100 mg XL tablet, Take 100 mg by mouth nightly. Indications: Hypertension, Disp: , Rfl:     lisinopril (PRINIVIL, ZESTRIL) 20 mg tablet, Take 20 mg by mouth nightly. Indications: Hypertension, Disp: , Rfl:     simvastatin (ZOCOR) 40 mg tablet, Take 40 mg by mouth nightly. Indications: HYPERCHOLESTEROLEMIA, Disp: , Rfl:    Date Last Reviewed:  3/14/2017   Incontinence History:  PROBLEM: YES/NO: COMMENTS:   Loss of urine with coughing NO    Loss of urine with lifting  NO    Loss of urine with exercise, running NO    Loss of urine with strong urge YES    Loss of urine with approaching the bathroom YES    Loss of urine with key in lock NO    Loss of urine as getting to toilet/removing clothing NO    Loss of urine when hearing running water YES    Have difficulty initiating a urine stream NO    Have difficulty stopping urine stream NO Unknown   Have to strain to empty bladder NO    Dribble urine when urinating NO    Dribble urine after emptying bladder NO    Experience pain with urination NO    Experience burning during urination YES Getting better   Have blood in urine NO      Voiding Patterns: Patient voids every 30 min to 1 hours during the day and 4 times during the night (every 2 hours). Going about every 30 mins. Patient reports that he empties bladder.   Patient uses pads for bladder protection; he changes pads 2 times per day. Fluid Intake: Patient drinks 2 cups of water per day. He consumes 1 cups of caffeinated beverages per day. Drinks 2 glasses a day of Coke. Patient not limit fluid intake to control bladder. Bowel Habits: Patient demonstrates normal bowel habits. Has 2-3 bowel movements a day. Mobility / Self Care: I  Personal / Social History:  · Sexually active? NO:   · Social activities restricted due to urinary incontinence? YES: Golf and daily activities. Number of Personal Factors/Comorbidities that affect the Plan of Care: 1-2: MODERATE COMPLEXITY   EXAMINATION:   External Observation:   · Voluntary Contraction: present  8 sec hold with poor coordination   · Voluntary Relaxation: present  · Involuntary Contraction: NT  · Involuntary Relaxation: Nt       Body Structures Involved:  1. Muscles Body Functions Affected:  1. Genitourinary Activities and Participation Affected:  1. Self Care  2. Community, Social and [place]   Number of elements that affect the Plan of Care: 3: MODERATE COMPLEXITY   CLINICAL PRESENTATION:   Presentation: Evolving clinical presentation with changing clinical characteristics: MODERATE COMPLEXITY   CLINICAL DECISION MAKING:   Outcome Measure: Tool Used: NIH - Chronic Prostatitis Symptom Index (NIH - CPSI for Males)   Score:  Initial: 3/5/10 Most Recent: X (Date: -- )   Interpretation of Score:  Pain:  Score 0 1-4 5-8 9-12 13-16 17-20 21   Modifier CH CI CJ CK CL CM CN     Urinary Symptoms:  Score 0 1 2-3 4-5 6-7 8-9 10   Modifier CH CI CJ CK CL CM CN     Quality of Life Index:  Score 0 1-2 3-4 5-7 8-9 10-11 12   Modifier CH CI CJ CK CL CM CN       ?  Self Care:     - CURRENT STATUS: CM - 80%-99% impaired, limited or restricted    - GOAL STATUS: CI - 1%-19% impaired, limited or restricted    - D/C STATUS:  ---------------To be determined---------------       Medical Necessity:   · Patient is expected to demonstrate progress in strength and coordination to increase independence with Kegel. .  · Patient demonstrates good rehab potential due to higher previous functional level. Reason for Services/Other Comments:  · Patient continues to require skilled intervention due to poor strength and endurance as well as poor bladder health contributing to incontinence. .   Use of outcome tool(s) and clinical judgement create a POC that gives a: Questionable prediction of patient's progress: MODERATE COMPLEXITY   TREATMENT:   (In addition to Assessment/Re-Assessment sessions the following treatments were rendered)  Pre-treatment Symptoms/Complaints: After last treatment had 3 days without leaking. After the three days were up it went back to normal as far as leaking. Very little leaking   Pain: Initial:   Pain Intensity 1: 0 0/10 Post Session:  0/10     THERAPEUTIC EXERCISE: (45 minutes):  Exercises per grid below to improve strength and coordination. Required moderate verbal and tactile cues to promote proper body breathing techniques. Progressed resistance, repetitions and complexity of movement as indicated. Exercises:  Patient instructed in pelvic floor exercises listed below:   Date:  3/6/2017 Date:  3/14/2017    Activity/Exercise      Pt education      Kegel in supine with manual assist for tactile feedback       Kegel in supine, sitting, and with sit to stand with biofeedback assist for visual feedback    20 min total  Various holds and reps. Quick and endurance. Pt requires increased cueing to decrease us of abdominal mm to improve Kegel. Not able to hold greater than 3 sec with biofeedback. 20 min total  Various holds and reps. Quick and endurance. Pt requires increased cueing to decrease us of abdominal mm to improve Kegel. Not able to hold greater than 3 sec with biofeedback.      Sitting on swiss ball with PF contractions  5 sec on 10 off x 10  5 sec on 10 off x 10     Standing with Kegel and marching X 10  X 10     Kegel with NMES 50 Hz 5 min 5 on 10 off 7 min 5 on 10 off  At 12 Hz 5 min 5 on 10 off 3 min                               · The following educational topics were reviewed with patient:   · Treatment/Session Assessment:  Pt making steady progress. Will continue to progress Kegels. Making good progress on resting tone. · Response to Treatment:  Good  · Compliance with Program/Exercises: Will assess as treatment progresses. · Recommendations/Intent for next treatment session: \"Next visit will focus on advancements to more challenging activities\".   Total Treatment Duration:  PT Patient Time In/Time Out  Time In: 1100  Time Out: [address] [employer],

## 2017-03-22 ENCOUNTER — APPOINTMENT (OUTPATIENT)
Dept: PHYSICAL THERAPY | Age: 82
End: 2017-03-22
Payer: MEDICARE

## 2017-03-27 ENCOUNTER — HOSPITAL ENCOUNTER (OUTPATIENT)
Dept: PHYSICAL THERAPY | Age: 82
Discharge: HOME | End: 2017-03-27
Payer: MEDICARE

## 2017-03-27 PROCEDURE — 97110 THERAPEUTIC EXERCISES: CPT

## 2017-03-27 NOTE — PROGRESS NOTES
Carrie Prader  : 1935 [facility] [address] [facility],[address], [place] [address]  [phone]   [phone]          OUTPATIENT PHYSICAL THERAPY:Daily Note 3/27/2017    ICD-10: Treatment Diagnosis: Urge incontinence (N39.41)  Precautions/Allergies:   Atorvastatin   Fall Risk Score: 1 (? 5 = High Risk)  MD Orders: Eval and treat MEDICAL/REFERRING DIAGNOSIS:  Overactive bladder [N32.81]   DATE OF ONSET: 10/26/2016  REFERRING PHYSICIAN: Andrés Smith MD  RETURN PHYSICIAN APPOINTMENT: TBD     ASSESSMENT:  Mr. Peace Lomax has been seen for 3 physical therapy sessions. He has gone from getting up 4 x per night to 1-2 nights per night. He has reduced his frequency from 30 min to 1-2 hours. He is still using 2-3 PPD. He is now about to coordinate his PF contraction about 50% of the time. He continues to be a good candidate for physical therapy to address urinary incontinence. PROBLEM LIST (Impacting functional limitations): 1. poor bladder health  2. Decreased strength of pelvic floor which limits bladder control INTERVENTIONS PLANNED:  1. Neuromuscular re-education  2. Biofeedback as needed  3. HEP  4. Bladder retraining  5. Bladder education  6. Electrical Stimulation  7. Home Exercise Program (HEP)  8. Neuromuscular Re-education/Strengthening  9. Range of Motion (ROM)  10. Therapeutic Activites     TREATMENT PLAN:  Effective Dates: 2017 TO 2017. Frequency/Duration: 1 time a week for 12 weeks  GOALS: (Goals have been discussed and agreed upon with patient.)  Short-Term Functional Goals: Time Frame: in 3 weeks  1. Patient will demonstrate independence with home exercise program.  2. Patient will demonstrate an independent contraction of the PFM without overflow muscle activity within 3 weeks with verbal cues only in order to progress pelvic floor strength pelvic floor strength and continence control. Discharge Goals: Time Frame: in 12 weeks  1.  Pt will demonstrate an increase in PFM endurance from 8 hold to 15 hold x 10 reps as measured by EMG within 6 weeks in order to improve PFM activity and thus decrease incontinence. 2. Patient will incorporate a voiding schedule and urge suppression techniques into hisdaily routine to manage urgency, frequency, and incontinence with a goal of 10 minutes of delay. 3. Pt report voiding no less than every hours. 4. Pt will report nocturia less than 2 x per night. Rehabilitation Potential For Stated Goals: Good  Regarding Lancaster Fillers therapy, I certify that the treatment plan above will be carried out by a therapist or under their direction. Thank you for this referral,  Roosevelt Henning, PT     Referring Physician Signature: Roger Aguilera MD              Date                    The information in this section was collected on 2/13/2017 (except where otherwise noted). HISTORY:   History of Present Injury/Illness (Reason for Referral): The patient is status post dilation of urethral stricture and bipolar photo vaporization of the prostate on October 26. Past Medical History/Comorbidities:   Mr. Louise Huang  has a past medical history of Atrial fibrillation ([address] CAD (coronary artery disease) (1987); Chronic obstructive pulmonary disease ([address] Elevated cholesterol; Hypertension; Left carotid stenosis; Myasthenia gravis ([address] Osteoarthritis (arthritis due to wear and tear of joints); Prostate cancer ([address] (2011); PVD (peripheral vascular disease) ([address] S/P total knee arthroplasty (11/14/2014); and Urinary urgency. Mr. Louise Huang  has a past surgical history that includes knee replacement (Right); vascular surgery procedure unlist (Right); cabg, artery-vein, two; and cataract removal (Bilateral). Social History/Living Environment:    Lives at home with wife  Prior Level of Function/Work/Activity:  Golf, retired, yardwork  Personal Factors:          Past/Current Experience:  Did not need a pad previously. Current Medications:    Current Outpatient Prescriptions:     oxybutynin chloride XL (DITROPAN XL) 10 mg CR tablet, Take 1 Tab by mouth daily. , Disp: 30 Tab, Rfl: 11    warfarin (COUMADIN) 7.5 mg tablet, Take 7.5 mg by mouth daily. Pt takes this 2 days a week, Disp: , Rfl:     warfarin (COUMADIN) 5 mg tablet, Take 5 mg by mouth daily. , Disp: , Rfl:     MYRBETRIQ 50 mg ER tablet, Take 1 Tab by mouth daily. , Disp: 42 Tab, Rfl: 0    cyanocobalamin 1,000 mcg tablet, Take 1,000 mcg by mouth nightly., Disp: , Rfl:     pyridostigmine (MESTINON) 60 mg tablet, Take 60 mg by mouth every morning., Disp: , Rfl:     metoprolol succinate (TOPROL-XL) 100 mg XL tablet, Take 100 mg by mouth nightly. Indications: Hypertension, Disp: , Rfl:     lisinopril (PRINIVIL, ZESTRIL) 20 mg tablet, Take 20 mg by mouth nightly. Indications: Hypertension, Disp: , Rfl:     simvastatin (ZOCOR) 40 mg tablet, Take 40 mg by mouth nightly. Indications: HYPERCHOLESTEROLEMIA, Disp: , Rfl:    Date Last Reviewed:  3/27/2017   Incontinence History:  PROBLEM: YES/NO: COMMENTS:   Loss of urine with coughing NO    Loss of urine with lifting  NO    Loss of urine with exercise, running NO    Loss of urine with strong urge YES    Loss of urine with approaching the bathroom YES    Loss of urine with key in lock NO    Loss of urine as getting to toilet/removing clothing NO    Loss of urine when hearing running water YES    Have difficulty initiating a urine stream NO    Have difficulty stopping urine stream NO Unknown   Have to strain to empty bladder NO    Dribble urine when urinating NO    Dribble urine after emptying bladder NO    Experience pain with urination NO    Experience burning during urination YES Getting better   Have blood in urine NO      Voiding Patterns: Patient voids every 30 min to 1 hours during the day and 4 times during the night (every 2 hours). Going about every 30 mins. Patient reports that he empties bladder.   Patient uses pads for bladder protection; he changes pads 2 times per day. Fluid Intake: Patient drinks 2 cups of water per day. He consumes 1 cups of caffeinated beverages per day. Drinks 2 glasses a day of Coke. Patient not limit fluid intake to control bladder. Bowel Habits: Patient demonstrates normal bowel habits. Has 2-3 bowel movements a day. Mobility / Self Care: I  Personal / Social History:  · Sexually active? NO:   · Social activities restricted due to urinary incontinence? YES: Golf and daily activities. Number of Personal Factors/Comorbidities that affect the Plan of Care: 1-2: MODERATE COMPLEXITY   EXAMINATION:   External Observation:   · Voluntary Contraction: present  8 sec hold with poor coordination   · Voluntary Relaxation: present  · Involuntary Contraction: NT  · Involuntary Relaxation: Nt       Body Structures Involved:  1. Muscles Body Functions Affected:  1. Genitourinary Activities and Participation Affected:  1. Self Care  2. Community, Social and [place]ese   Number of elements that affect the Plan of Care: 3: MODERATE COMPLEXITY   CLINICAL PRESENTATION:   Presentation: Evolving clinical presentation with changing clinical characteristics: MODERATE COMPLEXITY   CLINICAL DECISION MAKING:   Outcome Measure: Tool Used: NIH - Chronic Prostatitis Symptom Index (NIH - CPSI for Males)   Score:  Initial: 3/5/10 Most Recent: X (Date: -- )   Interpretation of Score:  Pain:  Score 0 1-4 5-8 9-12 13-16 17-20 21   Modifier CH CI CJ CK CL CM CN     Urinary Symptoms:  Score 0 1 2-3 4-5 6-7 8-9 10   Modifier CH CI CJ CK CL CM CN     Quality of Life Index:  Score 0 1-2 3-4 5-7 8-9 10-11 12   Modifier CH CI CJ CK CL CM CN       ?  Self Care:     - CURRENT STATUS: CM - 80%-99% impaired, limited or restricted    - GOAL STATUS: CI - 1%-19% impaired, limited or restricted    - D/C STATUS:  ---------------To be determined---------------       Medical Necessity:   · Patient is expected to demonstrate progress in strength and coordination to increase independence with Kegel. .  · Patient demonstrates good rehab potential due to higher previous functional level. Reason for Services/Other Comments:  · Patient continues to require skilled intervention due to poor strength and endurance as well as poor bladder health contributing to incontinence. .   Use of outcome tool(s) and clinical judgement create a POC that gives a: Questionable prediction of patient's progress: MODERATE COMPLEXITY   TREATMENT:   (In addition to Assessment/Re-Assessment sessions the following treatments were rendered)  Pre-treatment Symptoms/Complaints: Getting about 2 x and night and is not leaking. Leaking during the day is good. Is not leaking very often. Leaks mostly when he has not gone to the bathroom for a long time. Period of time between going is much better and having more volume when going to the bathroom. Pain: Initial:   Pain Intensity 1: 0 0/10 Post Session:  0/10     THERAPEUTIC EXERCISE: (45 minutes):  Exercises per grid below to improve strength and coordination. Required moderate verbal and tactile cues to promote proper body breathing techniques. Progressed resistance, repetitions and complexity of movement as indicated. Exercises:  Patient instructed in pelvic floor exercises listed below:   Date:  3/6/2017 Date:  3/14/2017 Date:  3/27/2017   Activity/Exercise      Pt education   5 min    Kegel in supine with manual assist for tactile feedback       Kegel in supine, sitting, and with sit to stand with biofeedback assist for visual feedback    20 min total  Various holds and reps. Quick and endurance. Pt requires increased cueing to decrease us of abdominal mm to improve Kegel. Not able to hold greater than 3 sec with biofeedback. 20 min total  Various holds and reps. Quick and endurance. Pt requires increased cueing to decrease us of abdominal mm to improve Kegel.   Not able to hold greater than 3 sec with biofeedback. 20 min total  Various holds and reps. Quick and endurance. Pt requires increased cueing to decrease us of abdominal mm to improve Kegel. Not able to hold greater than 3 sec with biofeedback. Sitting on swiss ball with PF contractions  5 sec on 10 off x 10  5 sec on 10 off x 10  5 sec on 10 off x 10    Standing with Kegel and marching X 10  X 10  X 10    Kegel with NMES 50 Hz 5 min 5 on 10 off 7 min 5 on 10 off  At 12 Hz 5 min 5 on 10 off 3 min                  8 min 5 on 10 off  At 12 Hz 5 min 5 on 10 off 3 min                 · The following educational topics were reviewed with patient: Ma  · Treatment/Session Assessment:  Pt continues to do excellent with therapy. His pad had minimal leaking. Went a whole day with a dry pad. · Response to Treatment:  Good  · Compliance with Program/Exercises: Will assess as treatment progresses. · Recommendations/Intent for next treatment session: \"Next visit will focus on advancements to more challenging activities\".   Total Treatment Duration:  PT Patient Time In/Time Out  Time In: 0948  Time Out: [address] [facility] Faye James PT

## 2017-04-03 ENCOUNTER — HOSPITAL ENCOUNTER (OUTPATIENT)
Dept: PHYSICAL THERAPY | Age: 82
Discharge: HOME | End: 2017-04-03
Payer: MEDICARE

## 2017-04-03 PROCEDURE — G8988 SELF CARE GOAL STATUS: HCPCS

## 2017-04-03 PROCEDURE — G8989 SELF CARE D/C STATUS: HCPCS

## 2017-04-03 PROCEDURE — 97110 THERAPEUTIC EXERCISES: CPT

## 2017-04-03 NOTE — PROGRESS NOTES
Tomlin Been  : [facility] [address], [address] [place] [address],[address], [address]  [phone]   [phone]          OUTPATIENT PHYSICAL THERAPY:Daily Note and Discharge 4/3/2017    ICD-10: Treatment Diagnosis: Urge incontinence (N39.41)  Precautions/Allergies:   Atorvastatin   Fall Risk Score: 1 (? 5 = High Risk)  MD Orders: Eval and treat MEDICAL/REFERRING DIAGNOSIS:  Overactive bladder [N32.81]   DATE OF ONSET: 10/26/2016  REFERRING PHYSICIAN: Rosalinda Crawford MD  RETURN PHYSICIAN APPOINTMENT: TBD     ASSESSMENT:  Mr. Noah Cabot has been seen for  6 physical therapy visits. He has done excellent with PT. He has only had one small leak last week when his bladder was full. Other than that he feels like he is back to normal.  He does not use a pad during the day. He does use one pad at night, but he reports he has not had any episodes of leaking at night. He is using the one pad for peace of mind. Today we talked about his maintenence program.  He reported good understanding. I instructed him to follow up with MD if symptoms last greater than 2 weeks. Her reported good understanding of our discussion. At this time I feel that he is appropriate for D/C with maintenance program.    PROBLEM LIST (Impacting functional limitations): 1. poor bladder health  2. Decreased strength of pelvic floor which limits bladder control INTERVENTIONS PLANNED:  1. Neuromuscular re-education  2. Biofeedback as needed  3. HEP  4. Bladder retraining  5. Bladder education  6. Electrical Stimulation  7. Home Exercise Program (HEP)  8. Neuromuscular Re-education/Strengthening  9. Range of Motion (ROM)  10. Therapeutic Activites     TREATMENT PLAN:  Effective Dates: 2017 TO 2017. Frequency/Duration: 1 time a week for 12 weeks  GOALS: (Goals have been discussed and agreed upon with patient.)  Short-Term Functional Goals: Time Frame: in 3 weeks  1.  Patient will demonstrate independence with home exercise program.(met)  Patient will demonstrate an independent contraction of the PFM without overflow muscle activity within 3 weeks with verbal cues only in order to progress pelvic floor strength pelvic floor strength and continence control. (met)  Discharge Goals: Time Frame: in 12 weeks  1. Pt will demonstrate an increase in PFM endurance from 8 hold to 15 hold x 10 reps as measured by EMG within 6 weeks in order to improve PFM activity and thus decrease incontinence. Met  2. Patient will incorporate a voiding schedule and urge suppression techniques into hisdaily routine to manage urgency, frequency, and incontinence with a goal of 10 minutes of delay. (met)  3. Pt report voiding no less than every hours. (met)  4. Pt will report nocturia less than 2 x per night.  (met)    Rehabilitation Potential For Stated Goals: Good  Regarding Donalynn Beat therapy, I certify that the treatment plan above will be carried out by a therapist or under their direction. Thank you for this referral,  Daija Peoples PT     Referring Physician Signature: Brittany Long MD              Date                    The information in this section was collected on 2/13/2017 (except where otherwise noted). HISTORY:   History of Present Injury/Illness (Reason for Referral): The patient is status post dilation of urethral stricture and bipolar photo vaporization of the prostate on October 26. Past Medical History/Comorbidities:   Mr. Ismael Sneed  has a past medical history of Atrial fibrillation ([address] CAD (coronary artery disease) (1987); Chronic obstructive pulmonary disease ([address] Elevated cholesterol; Hypertension; Left carotid stenosis; Myasthenia gravis ([address] Osteoarthritis (arthritis due to wear and tear of joints); Prostate cancer ([address] (2011); PVD (peripheral vascular disease) ([address] S/P total knee arthroplasty (11/14/2014); and Urinary urgency.   Mr. Ismael Sneed  has a past surgical history that includes knee replacement (Right); vascular surgery procedure unlist (Right); cabg, artery-vein, two; and cataract removal (Bilateral). Social History/Living Environment:    Lives at home with wife  Prior Level of Function/Work/Activity:  Golf, retired, yardwork  Personal Factors:          Past/Current Experience:  Did not need a pad previously. Current Medications:    Current Outpatient Prescriptions:     oxybutynin chloride XL (DITROPAN XL) 10 mg CR tablet, Take 1 Tab by mouth daily. , Disp: 30 Tab, Rfl: 11    warfarin (COUMADIN) 7.5 mg tablet, Take 7.5 mg by mouth daily. Pt takes this 2 days a week, Disp: , Rfl:     warfarin (COUMADIN) 5 mg tablet, Take 5 mg by mouth daily. , Disp: , Rfl:     MYRBETRIQ 50 mg ER tablet, Take 1 Tab by mouth daily. , Disp: 42 Tab, Rfl: 0    cyanocobalamin 1,000 mcg tablet, Take 1,000 mcg by mouth nightly., Disp: , Rfl:     pyridostigmine (MESTINON) 60 mg tablet, Take 60 mg by mouth every morning., Disp: , Rfl:     metoprolol succinate (TOPROL-XL) 100 mg XL tablet, Take 100 mg by mouth nightly. Indications: Hypertension, Disp: , Rfl:     lisinopril (PRINIVIL, ZESTRIL) 20 mg tablet, Take 20 mg by mouth nightly. Indications: Hypertension, Disp: , Rfl:     simvastatin (ZOCOR) 40 mg tablet, Take 40 mg by mouth nightly.  Indications: HYPERCHOLESTEROLEMIA, Disp: , Rfl:    Date Last Reviewed:  4/3/2017   Incontinence History:  PROBLEM: YES/NO: COMMENTS:   Loss of urine with coughing NO    Loss of urine with lifting  NO    Loss of urine with exercise, running NO    Loss of urine with strong urge No    Loss of urine with approaching the bathroom No    Loss of urine with key in lock NO    Loss of urine as getting to toilet/removing clothing NO    Loss of urine when hearing running water YES    Have difficulty initiating a urine stream NO    Have difficulty stopping urine stream NO    Have to strain to empty bladder NO    Dribble urine when urinating NO    Dribble urine after emptying bladder NO    Experience pain with urination NO    Experience burning during urination No    Have blood in urine NO      Voiding Patterns: Patient voids every 1 -2 hours during the day and 2 x's. Patient reports that he empties bladder. Patient uses pads for bladder protection; he changes pads 1 times per day. Fluid Intake: Patient drinks 2 cups of water per day. He consumes 1 cups of caffeinated beverages per day. Drinks 2 glasses a day of Coke. Patient not limit fluid intake to control bladder. Bowel Habits: Patient demonstrates normal bowel habits. Has 2-3 bowel movements a day. Mobility / Self Care: I  Personal / Social History:  · Sexually active? NO:   · Social activities restricted due to urinary incontinence? YES: Golf and daily activities. Number of Personal Factors/Comorbidities that affect the Plan of Care: 1-2: MODERATE COMPLEXITY   EXAMINATION:   External Observation:   · Voluntary Contraction: present  19 sec hold with good coordination   · Voluntary Relaxation: present  · Involuntary Contraction: NT  · Involuntary Relaxation: Nt       Body Structures Involved:  1. Muscles Body Functions Affected:  1. Genitourinary Activities and Participation Affected:  1. Self Care  2. Community, Social and [place]   Number of elements that affect the Plan of Care: 3: MODERATE COMPLEXITY   CLINICAL PRESENTATION:   Presentation: Evolving clinical presentation with changing clinical characteristics: MODERATE COMPLEXITY   CLINICAL DECISION MAKING:   Outcome Measure: Tool Used: NIH - Chronic Prostatitis Symptom Index (NIH - CPSI for Males)   Score:  Initial: 3/5/10 Most Recent: 0/1/3 (Date: 4/3/2017)   Interpretation of Score:  Pain:  Score 0 1-4 5-8 9-12 13-16 17-20 21   Modifier CH CI CJ CK CL CM CN     Urinary Symptoms:  Score 0 1 2-3 4-5 6-7 8-9 10   Modifier CH CI CJ CK CL CM CN     Quality of Life Index:  Score 0 1-2 3-4 5-7 8-9 10-11 12   Modifier CH CI CJ CK CL CM CN       ?  Self Care:     - CURRENT STATUS: CM - 80%-99% impaired, limited or restricted    - GOAL STATUS: CI - 1%-19% impaired, limited or restricted    - D/C STATUS:  CJ - 20%-39% impaired, limited or restricted       Medical Necessity:   · Patient is expected to demonstrate progress in strength and coordination to increase independence with Kegel. .  · Patient demonstrates good rehab potential due to higher previous functional level. Reason for Services/Other Comments:  · Patient continues to require skilled intervention due to poor strength and endurance as well as poor bladder health contributing to incontinence. .   Use of outcome tool(s) and clinical judgement create a POC that gives a: Questionable prediction of patient's progress: MODERATE COMPLEXITY   TREATMENT:   (In addition to Assessment/Re-Assessment sessions the following treatments were rendered)  Pre-treatment Symptoms/Complaints: Getting about 2 x and night and is not leaking. Only had one small leak last week when his bladder was full and he was rushing to the bathroom. Pain: Initial:   Pain Intensity 1: 0 0/10 Post Session:  0/10     THERAPEUTIC EXERCISE: (45 minutes):  Exercises per grid below to improve strength and coordination. Required moderate verbal and tactile cues to promote proper body breathing techniques. Progressed resistance, repetitions and complexity of movement as indicated. Exercises:  Patient instructed in pelvic floor exercises listed below:   Date:  3/27/2017 Date:  4/3/2017   Activity/Exercise     Pt education 5 min  15 min    Kegel in supine with manual assist for tactile feedback      Kegel in supine, sitting, and with sit to stand with biofeedback assist for visual feedback    20 min total  Various holds and reps. Quick and endurance. Pt requires increased cueing to decrease us of abdominal mm to improve Kegel. Not able to hold greater than 3 sec with biofeedback.      Sitting on swiss ball with PF contractions  5 sec on 10 off x 10     Standing with Kegel and marching X 10     Kegel with NMES 50 Hz 8 min 5 on 10 off  At 12 Hz 5 min 5 on 10 off 3 min                 · The following educational topics were reviewed with patient: Maintenence program and post therapy instructions. · Treatment/Session Assessment:  Pt appropriate for DC. · Response to Treatment:  Good  · Compliance with Program/Exercises: Will assess as treatment progresses. · Recommendations/Intent for next treatment session: \"Next visit will focus on advancements to more challenging activities\".   Total Treatment Duration:  PT Patient Time In/Time Out  Time In: 1000  Time Out: 1017    rIina Dunaway PT

## 2017-04-17 ENCOUNTER — HOSPITAL ENCOUNTER (OUTPATIENT)
Dept: PHYSICAL THERAPY | Age: 82
End: 2017-04-17
Payer: MEDICARE

## 2017-04-24 ENCOUNTER — APPOINTMENT (OUTPATIENT)
Dept: PHYSICAL THERAPY | Age: 82
End: 2017-04-24
Payer: MEDICARE

## 2017-11-27 PROBLEM — C61 PROSTATE CA (HCC): Status: ACTIVE | Noted: 2017-11-27
